# Patient Record
Sex: FEMALE | Race: OTHER | HISPANIC OR LATINO
[De-identification: names, ages, dates, MRNs, and addresses within clinical notes are randomized per-mention and may not be internally consistent; named-entity substitution may affect disease eponyms.]

---

## 2021-07-01 ENCOUNTER — NON-APPOINTMENT (OUTPATIENT)
Age: 36
End: 2021-07-01

## 2021-07-01 ENCOUNTER — APPOINTMENT (OUTPATIENT)
Dept: ORTHOPEDIC SURGERY | Facility: CLINIC | Age: 36
End: 2021-07-01
Payer: MEDICAID

## 2021-07-01 VITALS — HEIGHT: 64 IN | BODY MASS INDEX: 29.02 KG/M2 | WEIGHT: 170 LBS

## 2021-07-01 PROBLEM — Z00.00 ENCOUNTER FOR PREVENTIVE HEALTH EXAMINATION: Status: ACTIVE | Noted: 2021-07-01

## 2021-07-01 PROCEDURE — 99204 OFFICE O/P NEW MOD 45 MIN: CPT

## 2021-07-23 ENCOUNTER — APPOINTMENT (OUTPATIENT)
Dept: ORTHOPEDIC SURGERY | Facility: CLINIC | Age: 36
End: 2021-07-23
Payer: MEDICAID

## 2021-07-23 PROCEDURE — 99213 OFFICE O/P EST LOW 20 MIN: CPT

## 2021-07-23 PROCEDURE — 73560 X-RAY EXAM OF KNEE 1 OR 2: CPT | Mod: LT

## 2021-08-09 ENCOUNTER — APPOINTMENT (OUTPATIENT)
Dept: ORTHOPEDIC SURGERY | Facility: CLINIC | Age: 36
End: 2021-08-09
Payer: MEDICAID

## 2021-08-09 VITALS
WEIGHT: 170 LBS | BODY MASS INDEX: 29.02 KG/M2 | HEART RATE: 90 BPM | SYSTOLIC BLOOD PRESSURE: 123 MMHG | DIASTOLIC BLOOD PRESSURE: 85 MMHG | HEIGHT: 64 IN

## 2021-08-09 PROCEDURE — 99214 OFFICE O/P EST MOD 30 MIN: CPT

## 2021-08-13 NOTE — HISTORY OF PRESENT ILLNESS
[de-identified] : 36 year old female presents to the office with left knee pain 6/22/21, insidious onset after patient knee buckled while showering leading her to fall in bathtub. She was seen  at [place] and x-ray were negative. She followed up with Dr. Sharan branch with patella dislocation and referred her for further treatment. She has attended 2 session of PT with very small improvement of ROM. Ambulating via crutches. Reports decreased ROM, stiffness, swelling, weakness and limping. Pain indicated to anterior aspect of knee, 8/10, constant, achy and sharp at times. Denies locking, catching, numbness or tingling. \par \par The patient's past medical history, past surgical history, medications and allergies were reviewed by me today with the patient and documented accordingly. In addition, the patient's family and social history, which were noncontributory to this visit, were reviewed also.

## 2021-08-13 NOTE — DISCUSSION/SUMMARY
[de-identified] : 37 y/o female with left patella instability/chondral defect\par \par Patient has hypersensitivity throughout the lower extremity from what appears to be a left patella dislocation and subsequent chondral injury to the patellofemoral joint.  MRI  shows full thickness chondral in the apex of the patella and lateral patella facet 1.6 x 1.1 cm as well as disruption of the medial patella restraint.  Since her injury, patient has significantly limited range of motion, and recommendation would be for physical therapy in an attempt to improve range of motion and functionality of the lower extremity.  We discussed possible consideration of manipulation under anesthesia with diagnostic arthroscopy if symptoms do not significantly improve within 4 to 6 weeks.  We also discussed concerns regarding the chondral injury, and possibility for additional procedures for chondral resurfacing/stabilization of the patella.\par \par Recommendations: Trial PT. discontinue crutches, activity restriction.  OTC NSAID's or acetaminophen as tolerated, with application of ice to the area 2-3x daily for 20 minutes after periods of activity. \par \par Follow-up 4-6 weeks

## 2021-08-13 NOTE — PHYSICAL EXAM
[de-identified] : Oriented to time, place, person\par Mood: Normal\par Affect: Normal\par Appearance: Healthy, well appearing, no acute distress.\par Gait: antalgic\par Assistive Devices: one crutch\par \par Left Knee Exam:\par \par Skin: Clean, dry, intact\par Inspection: No obvious malalignment, no masses, min swelling, trace effusion\par Pulses: 2+ DP/PT pulses \par ROM: 0-25 degrees of flexion. ++hypersensitive with deep knee flexion\par Tenderness: Sensitive throughout the joint\par Stability: Stable to varus, valgus. Negative Lachman testing. Negative anterior drawer, negative posterior drawer.\par Strength: 5/5 Q/H/TA/GS/EHL, without atrophy\par Neuro: Intact to light touch throughout, DTRs normal\par Additional Tests: Negative Marisabel's test, Negative patellar grind test  [de-identified] : Images were reviewed from [facility] dated 7/23/21. \par \par Multiple images left knee showed no evidence of bony injury, or briana dislocation. There is no underlying degenerative arthritic change seen. Overall alignment is maintained.  Small fragmentation seen inferior pole patella.\par \par MRI left knee dated 6.30.2021 shows full thickness chondral defect in the apex of the patella and medial patella facet measuring 1.6 x 1.1 cm.  Mild lateral patella subluxation.  Medial patellar retinacular tear.

## 2021-08-13 NOTE — ADDENDUM
[FreeTextEntry1] : This note was written by Maddie Segura on 08/09/2021 acting solely as a scribe for Dr. Ronni Almodovar.\par \par All medical record entries made by the Scribe were at my, Dr. Ronni Almodovar, direction and personally dictated by me on 08/09/2021. I have personally reviewed the chart and agree that the record accurately reflects my personal performance of the history, physical exam, assessment and plan.

## 2021-09-27 ENCOUNTER — APPOINTMENT (OUTPATIENT)
Dept: ORTHOPEDIC SURGERY | Facility: CLINIC | Age: 36
End: 2021-09-27
Payer: MEDICAID

## 2021-09-27 VITALS — BODY MASS INDEX: 29.02 KG/M2 | HEIGHT: 64 IN | WEIGHT: 170 LBS

## 2021-09-27 PROCEDURE — 99213 OFFICE O/P EST LOW 20 MIN: CPT

## 2021-09-27 NOTE — PHYSICAL EXAM
[de-identified] : Oriented to time, place, person\par Mood: Normal\par Affect: Normal\par Appearance: Healthy, well appearing, no acute distress.\par Gait: antalgic\par Assistive Devices: none\par \par Left Knee Exam:\par \par Skin: Clean, dry, intact\par Inspection: No obvious malalignment, no masses, min swelling, no effusion\par Pulses: 2+ DP/PT pulses \par ROM: 0-80 degrees of flexion.  Pain with deep knee flexion\par Tenderness: Sensitive throughout the PF joint\par Stability: Stable to varus, valgus. Negative Lachman testing. Negative anterior drawer, negative posterior drawer. + J sign (BL)\par Strength: 5/5 Q/H/TA/GS/EHL, without atrophy\par Neuro: Intact to light touch throughout, DTRs normal\par Additional Tests: Negative Marisabel's test, +patellar grind test  [de-identified] : Images were reviewed from [facility] dated 7/23/21. \par \par Multiple images left knee showed no evidence of bony injury, or briana dislocation. There is no underlying degenerative arthritic change seen. Overall alignment is maintained.  Small fragmentation seen inferior pole patella.\par \par MRI left knee dated 6.30.2021 shows full thickness chondral defect in the apex of the patella and medial patella facet measuring 1.6 x 1.1 cm.  Mild lateral patella subluxation.  Medial patellar retinacular tear.

## 2021-09-27 NOTE — DISCUSSION/SUMMARY
[de-identified] : 35 y/o female with left patella instability/chondral defect\par \par Patient has improved symptoms to the left knee at this time.  She has improved range of motion, but with increased sensitivity to the patellofemoral joint.  The does appear to be patella malalignment with positive J sign clinically, as well as a catching sensation at 30 degrees of flexion consistent with the chondral defect.  Patient's hypersensitivity has significantly improved.  \par \par At this time, we began to discuss consideration of surgical management for correction of her current dysfunction.  We will obtain a CT scan to evaluate for lower extremity malalignment, and consideration of surgical stabilization of the patella with or without chondral resurfacing procedure.  We briefly discussed consideration of a tibial tubercle osteotomy +/- MPFL reconstruction +/- chondral resurfacing.\par \par Recommendations: Continue PT as discussed; quad strengthening, range of motion therapy. J-Brace use.  OTC NSAID's or acetaminophen as tolerated, with application of ice to the area 2-3x daily for 20 minutes.\par \par Follow-up after CT scan for surgical planning.

## 2021-09-27 NOTE — HISTORY OF PRESENT ILLNESS
[de-identified] : 36 year old female presents today for follow up of left knee instability. She has been attending PT 2 x per week and has been told by her therapist that her patella is unstable/loose.  The physical therapist has also told her to not wear her patella J brace.  On 6/22/21 the patient's knee buckled while showering leading her to fall in bathtub. Ambulating without assistive device at this time. Reports decreased ROM, stiffness, swelling, weakness and limping. Pain indicated to anterior aspect of knee, 9/10, constant, achy and sharp at times.  Range of motion is improving.  Denies locking, catching, numbness or tingling.

## 2021-09-27 NOTE — ADDENDUM
[FreeTextEntry1] : This note was written by Maddie Segura on 09/27/2021 acting solely as a scribe for Dr. Ronni Almodovar.\par \par All medical record entries made by the Scribe were at my, Dr. Ronni Almodovar, direction and personally dictated by me on 09/27/2021. I have personally reviewed the chart and agree that the record accurately reflects my personal performance of the history, physical exam, assessment and plan.

## 2021-09-29 ENCOUNTER — NON-APPOINTMENT (OUTPATIENT)
Age: 36
End: 2021-09-29

## 2021-09-30 ENCOUNTER — APPOINTMENT (OUTPATIENT)
Dept: CT IMAGING | Facility: CLINIC | Age: 36
End: 2021-09-30
Payer: MEDICAID

## 2021-09-30 ENCOUNTER — OUTPATIENT (OUTPATIENT)
Dept: OUTPATIENT SERVICES | Facility: HOSPITAL | Age: 36
LOS: 1 days | End: 2021-09-30
Payer: MEDICAID

## 2021-09-30 DIAGNOSIS — S83.002D: ICD-10-CM

## 2021-09-30 DIAGNOSIS — M23.8X2 OTHER INTERNAL DERANGEMENTS OF LEFT KNEE: ICD-10-CM

## 2021-09-30 PROCEDURE — 73700 CT LOWER EXTREMITY W/O DYE: CPT

## 2021-09-30 PROCEDURE — 73700 CT LOWER EXTREMITY W/O DYE: CPT | Mod: 26,LT

## 2021-10-05 ENCOUNTER — APPOINTMENT (OUTPATIENT)
Dept: ORTHOPEDIC SURGERY | Facility: CLINIC | Age: 36
End: 2021-10-05
Payer: MEDICAID

## 2021-10-05 VITALS
WEIGHT: 170 LBS | HEART RATE: 90 BPM | SYSTOLIC BLOOD PRESSURE: 125 MMHG | BODY MASS INDEX: 29.02 KG/M2 | DIASTOLIC BLOOD PRESSURE: 80 MMHG | HEIGHT: 64 IN

## 2021-10-05 PROCEDURE — 99214 OFFICE O/P EST MOD 30 MIN: CPT

## 2021-10-07 NOTE — DISCUSSION/SUMMARY
[de-identified] : 37 y/o female with left patella instability/chondral defect\par \par Patient presents for surgical consideration of her left knee.  I discussed surgical management in detail with the patient with a  present.  I discussed that she has two conditions of the knee that warrants further intervention at this time.  The first is her chondral defect.  The patient has a chondral defect of the medial patella facet with what appears to be a loose fragment within the patellofemoral joint as seen on CT imaging.  She is now approximately 3 months from injury, and we discussed consideration of surgical retrieval and possible fixation versus excision.  If the osteochondral fragment is unable to be repaired, she may be a candidate for future chondral resurfacing procedure.  \par \par Her second issue is her patella instability.  There is significant lateral subluxation of the patella with an elevated TTTG.  Consideration would be for surgical stabilization of the patella with either isolated medial soft tissue procedure with MPFL reconstruction vs. tibial tubercle osteotomy and MPFL reconstruction.  \par \par All risks, benefits and alternatives to the proposed surgical procedure, left knee surgical stabilization/chondral repair with medial patellofemoral ligament reconstruction/tibial tubercle osteotomy, as well as the need for formal post-operative rehabilitation were discussed in great detail with the patient. Risks include but are not limited to pain, bleeding, infection, neurovascular injury, stiffness, further surgical procedures, medical complications (including DVT, PE, MI), and risks of anesthesia. \par \par A discussion was also had with the patient regarding additional risk given recent Covid-19 pandemic; including the potential additional risk of anesthesia and any medical comorbidities that the patient has.  It was made clear to the patient that we are taking all precautions regarding Covid-19 with regards to surgical scheduling and perioperative care.  The patient understands that current protocol requires Covid-19 testing no more than 48hrs prior to surgery, and PST's may be performed onsite at the day of surgery. \par \par The patient expressed understanding and all questions were answered. The patient is electing to proceed, and will have the patient scheduled accordingly.

## 2021-10-07 NOTE — HISTORY OF PRESENT ILLNESS
[de-identified] : 36 year old female presents today for follow up of left knee instability.  To review, on 6/22/21 the patient's knee buckled while showering leading her to fall in bathtub.  She sustained a patella dislocation at that time.  She was referred to my service 2 months later and had significant loss of motion.  She has been undergoing physical therapy with improvements in motion.  Presents today for consideration of surgical intervention and review of imaging. Symptoms have remained the same since last visit. Reports decreased ROM, stiffness, swelling, weakness and limping. Still with sensation of catching to the anterior knee.

## 2021-10-07 NOTE — PHYSICAL EXAM
[de-identified] : Oriented to time, place, person\par Mood: Normal\par Affect: Normal\par Appearance: Healthy, well appearing, no acute distress.\par Gait: antalgic\par Assistive Devices: none\par \par Left Knee Exam:\par \par Skin: Clean, dry, intact\par Inspection: No obvious malalignment, no masses, min swelling, no effusion\par Pulses: 2+ DP/PT pulses \par ROM: 0-90 degrees of flexion.  Pain with deep knee flexion\par Tenderness: Sensitive throughout the PF joint\par Stability: Stable to varus, valgus. Negative Lachman testing. Negative anterior drawer, negative posterior drawer. + J sign (BL)\par Strength: 5/5 Q/H/TA/GS/EHL, without atrophy\par Neuro: Intact to light touch throughout, DTRs normal\par Additional Tests: Negative Marisabel's test, +patellar grind test  [de-identified] : Images were reviewed from [facility] dated 7/23/21. \par \par Multiple images left knee showed no evidence of bony injury, or briana dislocation. There is no underlying degenerative arthritic change seen. Overall alignment is maintained.  Small fragmentation seen inferior pole patella.\par \par MRI left knee dated 6.30.2021 shows full thickness chondral defect in the apex of the patella and medial patella facet measuring 1.6 x 1.1 cm.  Mild lateral patella subluxation.  Medial patellar retinacular tear.\par \par CT left knee dated 9/30/2021 shows evidence of cortical irregularity medial patella.  Lateral subluxation of patella. TTTG is 2.0cm.  Trochlear dysplasia.  Loose osteochondral fragment within the patellofemoral joint.

## 2021-10-20 ENCOUNTER — OUTPATIENT (OUTPATIENT)
Dept: OUTPATIENT SERVICES | Facility: HOSPITAL | Age: 36
LOS: 1 days | End: 2021-10-20

## 2021-10-20 VITALS
OXYGEN SATURATION: 98 % | SYSTOLIC BLOOD PRESSURE: 140 MMHG | WEIGHT: 177.91 LBS | RESPIRATION RATE: 18 BRPM | DIASTOLIC BLOOD PRESSURE: 80 MMHG | HEART RATE: 90 BPM | TEMPERATURE: 99 F | HEIGHT: 66 IN

## 2021-10-20 DIAGNOSIS — M25.362 OTHER INSTABILITY, LEFT KNEE: ICD-10-CM

## 2021-10-20 DIAGNOSIS — M23.8X2 OTHER INTERNAL DERANGEMENTS OF LEFT KNEE: ICD-10-CM

## 2021-10-20 DIAGNOSIS — Z98.891 HISTORY OF UTERINE SCAR FROM PREVIOUS SURGERY: Chronic | ICD-10-CM

## 2021-10-20 LAB
HCG UR QL: NEGATIVE — SIGNIFICANT CHANGE UP
HCT VFR BLD CALC: 39.4 % — SIGNIFICANT CHANGE UP (ref 34.5–45)
HGB BLD-MCNC: 13.1 G/DL — SIGNIFICANT CHANGE UP (ref 11.5–15.5)
MCHC RBC-ENTMCNC: 29.4 PG — SIGNIFICANT CHANGE UP (ref 27–34)
MCHC RBC-ENTMCNC: 33.2 GM/DL — SIGNIFICANT CHANGE UP (ref 32–36)
MCV RBC AUTO: 88.5 FL — SIGNIFICANT CHANGE UP (ref 80–100)
NRBC # BLD: 0 /100 WBCS — SIGNIFICANT CHANGE UP
NRBC # FLD: 0 K/UL — SIGNIFICANT CHANGE UP
PLATELET # BLD AUTO: 316 K/UL — SIGNIFICANT CHANGE UP (ref 150–400)
RBC # BLD: 4.45 M/UL — SIGNIFICANT CHANGE UP (ref 3.8–5.2)
RBC # FLD: 12.9 % — SIGNIFICANT CHANGE UP (ref 10.3–14.5)
WBC # BLD: 10.19 K/UL — SIGNIFICANT CHANGE UP (ref 3.8–10.5)
WBC # FLD AUTO: 10.19 K/UL — SIGNIFICANT CHANGE UP (ref 3.8–10.5)

## 2021-10-20 NOTE — H&P PST ADULT - PROBLEM SELECTOR PLAN 1
Pt scheduled for surgery on 11/3/2021.  Pre-op instructions provided. Pt verbalized understanding.   Pepcid provided for GI prophylaxis.   Pt given urine specimen cup for ucg on admission.   Pt given detailed verbal and written instructions on chlorhexidine wash. Pt verbalized understanding with teachback.   Pt states she is already scheduled for preop COVID testing.

## 2021-10-20 NOTE — H&P PST ADULT - HISTORY OF PRESENT ILLNESS
36 year old female with left knee injury after fall in 6/22/2021. Pt went for physical therapy but was still having pain. Pt presents today for presurgical evaluation for ... 36 year old female with left knee injury after fall in 6/22/2021. Pt went for physical therapy but still having pain. Pt presents today for presurgical evaluation for Left Diagnostic Knee Arthroscopy Open Reduction Internal Fixation Osteochondral Extra Articular Ligament Reconstruction Medial Patella Femoral Ligament Reconstruction Tibial Tubercle Osteotomy.

## 2021-10-31 ENCOUNTER — APPOINTMENT (OUTPATIENT)
Dept: DISASTER EMERGENCY | Facility: CLINIC | Age: 36
End: 2021-10-31

## 2021-10-31 DIAGNOSIS — Z01.818 ENCOUNTER FOR OTHER PREPROCEDURAL EXAMINATION: ICD-10-CM

## 2021-11-02 ENCOUNTER — TRANSCRIPTION ENCOUNTER (OUTPATIENT)
Age: 36
End: 2021-11-02

## 2021-11-02 VITALS
HEIGHT: 66 IN | HEART RATE: 85 BPM | TEMPERATURE: 97 F | OXYGEN SATURATION: 100 % | DIASTOLIC BLOOD PRESSURE: 85 MMHG | WEIGHT: 177.91 LBS | RESPIRATION RATE: 16 BRPM | SYSTOLIC BLOOD PRESSURE: 143 MMHG

## 2021-11-02 LAB — SARS-COV-2 N GENE NPH QL NAA+PROBE: NOT DETECTED

## 2021-11-02 RX ORDER — ASPIRIN 325 MG/1
325 TABLET, FILM COATED ORAL DAILY
Qty: 28 | Refills: 0 | Status: ACTIVE | COMMUNITY
Start: 2021-11-02 | End: 1900-01-01

## 2021-11-02 RX ORDER — OXYCODONE AND ACETAMINOPHEN 5; 325 MG/1; MG/1
5-325 TABLET ORAL
Qty: 30 | Refills: 0 | Status: ACTIVE | COMMUNITY
Start: 2021-11-02 | End: 1900-01-01

## 2021-11-03 ENCOUNTER — APPOINTMENT (OUTPATIENT)
Dept: ORTHOPEDIC SURGERY | Facility: AMBULATORY SURGERY CENTER | Age: 36
End: 2021-11-03

## 2021-11-03 ENCOUNTER — OUTPATIENT (OUTPATIENT)
Dept: OUTPATIENT SERVICES | Facility: HOSPITAL | Age: 36
LOS: 1 days | Discharge: HOME | End: 2021-11-03
Payer: MEDICAID

## 2021-11-03 VITALS
SYSTOLIC BLOOD PRESSURE: 127 MMHG | TEMPERATURE: 98 F | HEART RATE: 78 BPM | OXYGEN SATURATION: 99 % | DIASTOLIC BLOOD PRESSURE: 82 MMHG | RESPIRATION RATE: 15 BRPM

## 2021-11-03 DIAGNOSIS — M25.362 OTHER INSTABILITY, LEFT KNEE: ICD-10-CM

## 2021-11-03 DIAGNOSIS — Z98.891 HISTORY OF UTERINE SCAR FROM PREVIOUS SURGERY: Chronic | ICD-10-CM

## 2021-11-03 PROCEDURE — 27418 REPAIR DEGENERATED KNEECAP: CPT | Mod: LT

## 2021-11-03 PROCEDURE — 29874 ARTHRS KNEE SURG RMV LOOS/FB: CPT | Mod: LT

## 2021-11-03 NOTE — ASU DISCHARGE PLAN (ADULT/PEDIATRIC) - PROCEDURE
Left diagnostic knee arthroscopy, open reduction and internal fixation of osteochondral fragment, extraarticular ligament reconstruction, tibial tubercle osteotomy

## 2021-11-03 NOTE — ASU DISCHARGE PLAN (ADULT/PEDIATRIC) - CARE PROVIDER_API CALL
Ronni Almodovar)  Orthopedics  [address]  [place] [address]  Phone: [phone]  Fax: [phone]  Follow Up Time: 1 week

## 2021-11-15 ENCOUNTER — APPOINTMENT (OUTPATIENT)
Dept: ORTHOPEDIC SURGERY | Facility: CLINIC | Age: 36
End: 2021-11-15
Payer: MEDICAID

## 2021-11-15 PROBLEM — R76.8 OTHER SPECIFIED ABNORMAL IMMUNOLOGICAL FINDINGS IN SERUM: Chronic | Status: ACTIVE | Noted: 2021-10-20

## 2021-11-15 PROCEDURE — 99024 POSTOP FOLLOW-UP VISIT: CPT

## 2021-11-15 PROCEDURE — 73560 X-RAY EXAM OF KNEE 1 OR 2: CPT | Mod: LT

## 2021-11-17 NOTE — HISTORY OF PRESENT ILLNESS
[6] : the patient reports pain that is 6/10 in severity [Clean/Dry/Intact] : clean, dry and intact [Healed] : healed [Neuro Intact] : an unremarkable neurological exam [Vascular Intact] : ~T peripheral vascular exam normal [Doing Well] : is doing well [Excellent Pain Control] : has excellent pain control [No Sign of Infection] : is showing no signs of infection [Sutures Removed] : sutures were removed [Steri-Strips Removed & Replaced] : steri-strips removed and replaced [Chills] : no chills [Fever] : no fever [Nausea] : no nausea [Vomiting] : no vomiting [Erythema] : not erythematous [Discharge] : absent of discharge [Swelling] : not swollen [Dehiscence] : not dehisced [de-identified] : 37 y/o female s/p left knee TAYLER, removal of loose body, chondroplasty medial patellar facet, lateral release, tibial tubercle osteotomy, medial patella imbrication 11.3.2021 [de-identified] : 37 y/o female s/p left knee TAYLER, removal of loose body, chondroplasty medial patellar facet, lateral release, tibial tubercle osteotomy, medial patella imbrication. She is doing well. Presents in [place] brace and ambulating via crutches. She is taking Percocet for pain. Denies post op complications.  [de-identified] : Left Knee Exam:\par \par Skin: Incision(s) Clean, dry, intact, no drainage, healed\par Inspection: Residual swelling, moderate residual effusion, ecchymosis\par Pulses: 2+ DP/PT pulses \par ROM: Not tested \par Tenderness: Tender throughout anterior knee joint.\par Stability: Stable\par Strength: Intact Q/H/TA/GS/EHL\par Neuro: Intact to light touch throughout  [de-identified] : \par The following radiographs were ordered and read by me during this patients visit. I reviewed each radiograph in detail with the patient and discussed the findings as highlighted below. \par \par 2 views left knee show evidence of tibial tubercle osteotomy with 1 cm medial translation tubercle. [de-identified] : 35 y/o female s/p left knee TAYLER, removal of loose body, chondroplasty medial patellar facet, lateral release, tibial tubercle osteotomy, medial patella imbrication\par \par All intraoperative imaging was discussed in detail with the patient. All questions were answered.  We discussed role of osteotomy in patients current condition, as well as the concern for degree of chondral damage within the medial patellar facet.\par \par Recommendations: \par 1. PT evaluation and treatment; including AAROM/AROM, therapeutic exercise (include hamstring and quadriceps strengthening), heel slides, nonweightbearing stretch of the gastrocsoleus complex and straight leg raises to prevent extension lag. \par 2. Brace: TTWB. Unlock brace for ambulation as tolerated. Remove the brace for sleeping as tolerated. \par 3. Meds: Continue [id number] daily, pain medication prn, may transition to NSAIDS.\par 4. Ice/elevate as needed and\par 5. Restrictions: None \par \par Followup in 4 weeks.

## 2021-11-17 NOTE — ADDENDUM
[FreeTextEntry1] : This note was written by Maddie Segura on 11/15/2021 acting solely as a scribe for Dr. Ronni Almodovar.\par \par All medical record entries made by the Scribe were at my, Dr. Ronni Almodovar, direction and personally dictated by me on 11/15/2021. I have personally reviewed the chart and agree that the record accurately reflects my personal performance of the history, physical exam, assessment and plan.

## 2021-12-16 ENCOUNTER — APPOINTMENT (OUTPATIENT)
Dept: ORTHOPEDIC SURGERY | Facility: CLINIC | Age: 36
End: 2021-12-16
Payer: MEDICAID

## 2021-12-16 PROCEDURE — 99024 POSTOP FOLLOW-UP VISIT: CPT

## 2021-12-16 PROCEDURE — 73562 X-RAY EXAM OF KNEE 3: CPT | Mod: LT

## 2021-12-16 NOTE — HISTORY OF PRESENT ILLNESS
[0] : no pain reported [Clean/Dry/Intact] : clean, dry and intact [Healed] : healed [Neuro Intact] : an unremarkable neurological exam [Vascular Intact] : ~T peripheral vascular exam normal [Doing Well] : is doing well [Excellent Pain Control] : has excellent pain control [No Sign of Infection] : is showing no signs of infection [Chills] : no chills [Fever] : no fever [Nausea] : no nausea [Vomiting] : no vomiting [Erythema] : not erythematous [Discharge] : absent of discharge [Swelling] : not swollen [Dehiscence] : not dehisced [de-identified] : 35 y/o female s/p left knee TAYLER, removal of loose body, chondroplasty medial patellar facet, lateral release, tibial tubercle osteotomy, medial patella imbrication 11.3.2021 [de-identified] : 37 y/o female s/p left knee TAYLER, removal of loose body, chondroplasty medial patellar facet, lateral release, tibial tubercle osteotomy, medial patella imbrication. Attending PT 2x per week.  She is doing well. Presents in unlocked [place] brace and ambulating via crutches. She is not taking pain medication. Denies post op complications.  [de-identified] : Left Knee Exam:\par \par Skin: Incision(s) Clean, dry, intact, no drainage, healed\par Inspection: Residual swelling, mild residual effusion\par Pulses: 2+ DP/PT pulses \par ROM: 0-65 knee flexion with pain.  \par Tenderness: mild tender throughout anterior knee joint.\par Stability: Stable\par Strength: Intact Q/H/TA/GS/EHL, atrophy to the quad\par Neuro: Intact to light touch throughout  [de-identified] : The following radiographs were ordered and read by me during this patients visit. I reviewed each radiograph in detail with the patient and discussed the findings as highlighted below. \par \par 2 views left knee show continued evidence of tibial tubercle osteotomy with 1 cm medial translation tubercle. Evidence of union. [de-identified] : 35 y/o female s/p left knee TAYLER, removal of loose body, chondroplasty medial patellar facet, lateral release, tibial tubercle osteotomy, medial patella imbrication\par \par Patient is doing well with postoperative rehabilitation at this time, but does have some stiffness with high degree of flexion. We discussed next steps which include weightbearing as tolerated in the brace unlocked.  Patient may also benefit from weightbearing without crutches.\par \par Recommendations: \par 1. PT evaluation and treatment, continued range of motion therapy.\par 2. Brace: WBAT. Unlock brace for ambulation as tolerated. Remove the brace for sleeping as tolerated. \par 3. Meds: NSAIDS.\par 4. Ice/elevate as needed \par \par Followup in 6 weeks.

## 2021-12-16 NOTE — ADDENDUM
[FreeTextEntry1] : This note was written by Maddie Segura on 12/16/2021 acting solely as a scribe for Dr. Ronni Almodovar.\par \par All medical record entries made by the Scribe were at my, Dr. Ronni Almodovar, direction and personally dictated by me on 12/16/2021. I have personally reviewed the chart and agree that the record accurately reflects my personal performance of the history, physical exam, assessment and plan.

## 2022-02-10 ENCOUNTER — APPOINTMENT (OUTPATIENT)
Dept: ORTHOPEDIC SURGERY | Facility: CLINIC | Age: 37
End: 2022-02-10
Payer: MEDICAID

## 2022-02-10 VITALS — HEIGHT: 64 IN | WEIGHT: 170 LBS | BODY MASS INDEX: 29.02 KG/M2

## 2022-02-10 PROCEDURE — 99213 OFFICE O/P EST LOW 20 MIN: CPT

## 2022-02-11 NOTE — DISCUSSION/SUMMARY
[de-identified] : 35 y/o female s/p left knee TAYLER, removal of loose body, chondroplasty medial patellar facet, lateral release, tibial tubercle osteotomy, medial patella imbrication\par \par Patient is doing well with postoperative rehabilitation at this time and is progressing well with her postoperative rehabilitation program.  She continues to have some stiffness with high degree of flexion and we discussed aggressive ROM therapy to further improve the function of the knee given radiographic union of the tibial tubercle osteotomy. The patient is very happy with her progress at this time, and preoperative symptoms of catching within the patellofemoral joint have resolved.. \par \par Recommendations: Continue PT evaluation and treatment, continued focus on range of motion therapy. NSAIDS / Ice/elevate as needed \par \par Followup 3 months.

## 2022-02-11 NOTE — PHYSICAL EXAM
[de-identified] : Oriented to time, place, person\par Mood: Normal\par Affect: Normal\par Appearance: Healthy, well appearing, no acute distress.\par Gait: Normal\par Assistive Devices: None \par \par Left Knee Exam:\par \par Skin: Incision(s) Clean, dry, intact, no drainage, healed\par Inspection: Residual swelling, no effusion\par Pulses: 2+ DP/PT pulses \par ROM: 0-90 knee flexion.  Pain with further flexion\par Tenderness: None\par Stability: Stable\par Strength: 4+/5 Q 5/5 TA/GS/EHL, atrophy to the quad\par Neuro: Intact to light touch throughout.  [de-identified] : The following radiographs were ordered and read by me during this patients visit. I reviewed each radiograph in detail with the patient and discussed the findings as highlighted below. \par \par 2 views left knee show continued evidence of tibial tubercle osteotomy with 1 cm medial translation tubercle. Evidence of union.

## 2022-02-11 NOTE — ADDENDUM
[FreeTextEntry1] : This note was written by Maddie Segura on 02/10/2022 acting solely as a scribe for Dr. Ronni Almodovar.\par \par All medical record entries made by the Scribe were at my, Dr. Ronni Almodovar, direction and personally dictated by me on 02/10/2022. I have personally reviewed the chart and agree that the record accurately reflects my personal performance of the history, physical exam, assessment and plan.

## 2022-02-11 NOTE — HISTORY OF PRESENT ILLNESS
[8] : the patient reports pain that is 8/10 in severity [Clean/Dry/Intact] : clean, dry and intact [Healed] : healed [Neuro Intact] : an unremarkable neurological exam [Vascular Intact] : ~T peripheral vascular exam normal [Doing Well] : is doing well [Excellent Pain Control] : has excellent pain control [No Sign of Infection] : is showing no signs of infection [Chills] : no chills [Fever] : no fever [Nausea] : no nausea [Vomiting] : no vomiting [Erythema] : not erythematous [Discharge] : absent of discharge [Swelling] : not swollen [Dehiscence] : not dehisced [de-identified] : 35 y/o female s/p left knee TAYLER, removal of loose body, chondroplasty medial patellar facet, lateral release, tibial tubercle osteotomy, medial patella imbrication 11.3.2021 [de-identified] : Left Knee Exam:\par \par Skin: Incision(s) Clean, dry, intact, no drainage, healed\par Inspection: Residual swelling, no effusion\par Pulses: 2+ DP/PT pulses \par ROM: 0-90 knee flexion with pain.  \par Tenderness: no tender throughout anterior knee joint.\par Stability: Stable\par Strength: Intact Q/H/TA/GS/EHL, atrophy to the quad\par Neuro: Intact to light touch throughout  [de-identified] : The following radiographs were ordered and read by me during this patients visit. I reviewed each radiograph in detail with the patient and discussed the findings as highlighted below. \par \par 2 views left knee show continued evidence of tibial tubercle osteotomy with 1 cm medial translation tubercle. Evidence of union. [de-identified] : 35 y/o female s/p left knee TAYLER, removal of loose body, chondroplasty medial patellar facet, lateral release, tibial tubercle osteotomy, medial patella imbrication\par \par Patient is doing well with postoperative rehabilitation at this time, but does have some stiffness with high degree of flexion. We discussed next steps which include weightbearing as tolerated in the brace unlocked.  Patient may also benefit from weightbearing without crutches.\par \par Recommendations: \par 1. PT evaluation and treatment, continued range of motion therapy.\par 2. Brace: WBAT. Unlock brace for ambulation as tolerated. Remove the brace for sleeping as tolerated. \par 3. Meds: NSAIDS.\par 4. Ice/elevate as needed \par \par Followup in 6 weeks. [de-identified] : 35 y/o female s/p left knee TAYLER, removal of loose body, chondroplasty medial patellar facet, lateral release, tibial tubercle osteotomy, medial patella imbrication. Attending PT 2x per week. She is doing well. She has transitioned out of brace and ambulating with out assistive device. She is not taking pain medication. Denies post op complications.

## 2022-02-11 NOTE — PHYSICAL EXAM
[de-identified] : Oriented to time, place, person\par Mood: Normal\par Affect: Normal\par Appearance: Healthy, well appearing, no acute distress.\par Gait: Normal\par Assistive Devices: None \par \par Left Knee Exam:\par \par Skin: Incision(s) Clean, dry, intact, no drainage, healed\par Inspection: Residual swelling, no effusion\par Pulses: 2+ DP/PT pulses \par ROM: 0-90 knee flexion.  Pain with further flexion\par Tenderness: None\par Stability: Stable\par Strength: 4+/5 Q 5/5 TA/GS/EHL, atrophy to the quad\par Neuro: Intact to light touch throughout.  [de-identified] : The following radiographs were ordered and read by me during this patients visit. I reviewed each radiograph in detail with the patient and discussed the findings as highlighted below. \par \par 2 views left knee show continued evidence of tibial tubercle osteotomy with 1 cm medial translation tubercle. Evidence of union.

## 2022-02-11 NOTE — HISTORY OF PRESENT ILLNESS
[8] : the patient reports pain that is 8/10 in severity [Clean/Dry/Intact] : clean, dry and intact [Healed] : healed [Neuro Intact] : an unremarkable neurological exam [Vascular Intact] : ~T peripheral vascular exam normal [Doing Well] : is doing well [Excellent Pain Control] : has excellent pain control [No Sign of Infection] : is showing no signs of infection [Chills] : no chills [Fever] : no fever [Nausea] : no nausea [Vomiting] : no vomiting [Erythema] : not erythematous [Discharge] : absent of discharge [Swelling] : not swollen [Dehiscence] : not dehisced [de-identified] : 37 y/o female s/p left knee TAYLER, removal of loose body, chondroplasty medial patellar facet, lateral release, tibial tubercle osteotomy, medial patella imbrication 11.3.2021 [de-identified] : Left Knee Exam:\par \par Skin: Incision(s) Clean, dry, intact, no drainage, healed\par Inspection: Residual swelling, no effusion\par Pulses: 2+ DP/PT pulses \par ROM: 0-90 knee flexion with pain.  \par Tenderness: no tender throughout anterior knee joint.\par Stability: Stable\par Strength: Intact Q/H/TA/GS/EHL, atrophy to the quad\par Neuro: Intact to light touch throughout  [de-identified] : The following radiographs were ordered and read by me during this patients visit. I reviewed each radiograph in detail with the patient and discussed the findings as highlighted below. \par \par 2 views left knee show continued evidence of tibial tubercle osteotomy with 1 cm medial translation tubercle. Evidence of union. [de-identified] : 35 y/o female s/p left knee TAYLER, removal of loose body, chondroplasty medial patellar facet, lateral release, tibial tubercle osteotomy, medial patella imbrication\par \par Patient is doing well with postoperative rehabilitation at this time, but does have some stiffness with high degree of flexion. We discussed next steps which include weightbearing as tolerated in the brace unlocked.  Patient may also benefit from weightbearing without crutches.\par \par Recommendations: \par 1. PT evaluation and treatment, continued range of motion therapy.\par 2. Brace: WBAT. Unlock brace for ambulation as tolerated. Remove the brace for sleeping as tolerated. \par 3. Meds: NSAIDS.\par 4. Ice/elevate as needed \par \par Followup in 6 weeks. [de-identified] : 35 y/o female s/p left knee TAYLER, removal of loose body, chondroplasty medial patellar facet, lateral release, tibial tubercle osteotomy, medial patella imbrication. Attending PT 2x per week. She is doing well. She has transitioned out of brace and ambulating with out assistive device. She is not taking pain medication. Denies post op complications.

## 2022-02-11 NOTE — DISCUSSION/SUMMARY
[de-identified] : 35 y/o female s/p left knee TAYLER, removal of loose body, chondroplasty medial patellar facet, lateral release, tibial tubercle osteotomy, medial patella imbrication\par \par Patient is doing well with postoperative rehabilitation at this time and is progressing well with her postoperative rehabilitation program.  She continues to have some stiffness with high degree of flexion and we discussed aggressive ROM therapy to further improve the function of the knee given radiographic union of the tibial tubercle osteotomy. The patient is very happy with her progress at this time, and preoperative symptoms of catching within the patellofemoral joint have resolved.. \par \par Recommendations: Continue PT evaluation and treatment, continued focus on range of motion therapy. NSAIDS / Ice/elevate as needed \par \par Followup 3 months.

## 2022-05-17 ENCOUNTER — APPOINTMENT (OUTPATIENT)
Dept: ORTHOPEDIC SURGERY | Facility: CLINIC | Age: 37
End: 2022-05-17
Payer: MEDICAID

## 2022-05-17 VITALS — HEIGHT: 64 IN | WEIGHT: 170 LBS | BODY MASS INDEX: 29.02 KG/M2

## 2022-05-17 PROCEDURE — 99213 OFFICE O/P EST LOW 20 MIN: CPT

## 2022-05-17 PROCEDURE — 73562 X-RAY EXAM OF KNEE 3: CPT | Mod: LT

## 2022-05-20 NOTE — PHYSICAL EXAM
[de-identified] : Oriented to time, place, person\par Mood: Normal\par Affect: Normal\par Appearance: Healthy, well appearing, no acute distress.\par Gait: Normal\par Assistive Devices: None \par \par Left Knee Exam:\par \par Skin: Incision(s) Clean, dry, intact, no drainage, healed\par Inspection: Residual swelling, no effusion\par Pulses: 2+ DP/PT pulses \par ROM: 0-120 knee flexion.  Pain with further flexion\par Tenderness: None\par Stability: Stable\par Strength: 4+/5 Q 5/5 TA/GS/EHL, atrophy to the quad\par Neuro: Intact to light touch throughout.  [de-identified] : The following radiographs were ordered and read by me during this patients visit. I reviewed each radiograph in detail with the patient and discussed the findings as highlighted below. \par \par 2 views left knee show continued evidence of tibial tubercle osteotomy with 1 cm medial translation tubercle. Evidence of union.

## 2022-05-20 NOTE — HISTORY OF PRESENT ILLNESS
[de-identified] : 36 y/o female s/p left knee TAYLER, removal of loose body, chondroplasty medial patellar facet, lateral release, tibial tubercle osteotomy, medial patella imbrication. She finished pt 2 weeks ago and able to do stationery bike without pain. C/O pain with knee flexion and climbing stairs. She is doing well. She is not taking pain medication. Denies post op complications.

## 2022-05-20 NOTE — ADDENDUM
[FreeTextEntry1] : This note was written by Maddie Segura on 05/17/2022 acting solely as a scribe for Dr. Ronni Almodovar.\par \par All medical record entries made by the Scribe were at my, Dr. Ronni Almodovar, direction and personally dictated by me on 05/17/2022. I have personally reviewed the chart and agree that the record accurately reflects my personal performance of the history, physical exam, assessment and plan.

## 2022-05-20 NOTE — DISCUSSION/SUMMARY
[de-identified] : 36 y/o female s/p left knee TAYLER, removal of loose body, chondroplasty medial patellar facet, lateral release, tibial tubercle osteotomy, medial patella imbrication\par \par Patient has completed a postoperative rehabilitation program.  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 The patient is very happy with her progress at this time, and preoperative symptoms of catching and instability to the patellofemoral joint have resolved. \par \par Recommendations: Continue PT evaluation and treatment, new Rx given. HEP.  Activity to tolerance. NSAIDS / Ice/elevate as needed \par \par Followup 1 year post-op.

## 2022-12-06 ENCOUNTER — APPOINTMENT (OUTPATIENT)
Dept: ORTHOPEDIC SURGERY | Facility: CLINIC | Age: 37
End: 2022-12-06

## 2022-12-06 VITALS — WEIGHT: 170 LBS | HEIGHT: 64 IN | BODY MASS INDEX: 29.02 KG/M2

## 2022-12-06 DIAGNOSIS — S83.002D UNSPECIFIED SUBLUXATION OF LEFT PATELLA, SUBSEQUENT ENCOUNTER: ICD-10-CM

## 2022-12-06 DIAGNOSIS — M23.8X2 OTHER INTERNAL DERANGEMENTS OF LEFT KNEE: ICD-10-CM

## 2022-12-06 PROCEDURE — 99213 OFFICE O/P EST LOW 20 MIN: CPT

## 2022-12-06 PROCEDURE — 73562 X-RAY EXAM OF KNEE 3: CPT | Mod: LT

## 2022-12-07 PROBLEM — M23.8X2 CHONDRAL DEFECT OF LEFT PATELLA: Status: ACTIVE | Noted: 2021-08-13

## 2022-12-07 PROBLEM — S83.002D SUBLUXATION OF LEFT PATELLA, SUBSEQUENT ENCOUNTER: Status: ACTIVE | Noted: 2021-07-23

## 2022-12-07 NOTE — PHYSICAL EXAM
[de-identified] : Oriented to time, place, person\par Mood: Normal\par Affect: Normal\par Appearance: Healthy, well appearing, no acute distress.\par Gait: Normal\par Assistive Devices: None \par \par Left Knee Exam:\par \par Skin: Incision(s) Clean, dry, intact, no drainage, healed\par Inspection: No swelling, no effusion\par Pulses: 2+ DP/PT pulses \par ROM: 0-120 knee flexion.  Pain with further flexion\par Tenderness: None\par Stability: Stable, no apprehension with patella stress\par Strength: 4+/5 Q 5/5 TA/GS/EHL, mild atrophy to the quad\par Neuro: Intact to light touch throughout.  [de-identified] : The following radiographs were ordered and read by me during this patients visit. I reviewed each radiograph in detail with the patient and discussed the findings as highlighted below. \par \par 2 views left knee show continued evidence of tibial tubercle osteotomy with 1 cm medial translation tubercle. Evidence of union.

## 2022-12-07 NOTE — ADDENDUM
[FreeTextEntry1] : This note was written by Maddie Segura on 12/06/2022 acting solely as a scribe for Dr. Ronni Almodovar.\par \par All medical record entries made by the Scribe were at my, Dr. Ronni Almodovar, direction and personally dictated by me on 12/06/2022. I have personally reviewed the chart and agree that the record accurately reflects my personal performance of the history, physical exam, assessment and plan.

## 2022-12-07 NOTE — HISTORY OF PRESENT ILLNESS
[de-identified] : 36 y/o female s/p left knee TAYLER, removal of loose body, chondroplasty medial patellar facet, lateral release, tibial tubercle osteotomy, medial patella imbrication 11.3.2021. Here for one year follow up. C/O pain in cold weather otherwise she is doing well. She has not gone to PT recently due to inability presented because of insurance. Takes Tylenol as needed. Denies post op complications.  + Mild discomfort over the anterior knee.  Patient does report significant improvement of symptoms from preoperative pain.

## 2022-12-07 NOTE — DISCUSSION/SUMMARY
[de-identified] : 38 y/o female s/p left knee TAYLER, removal of loose body, chondroplasty medial patellar facet, lateral release, tibial tubercle osteotomy, medial patella imbrication\par \par Patient has completed a postoperative rehabilitation program.  Patient is a been unable to go to physical therapy recently, and has plateaued with her range of motion but approximately 120 degrees. 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 The patient is very happy with her progress at this time, and preoperative symptoms of catching and instability to the patellofemoral joint have resolved.  We discussed that her mild complaints of pain during cold weather as well as some pain over the hardware is not worrisome.\par \par Recommendations: Retry PT for ROM therapy, new Rx given. HEP.  Activity to tolerance. NSAIDS / Ice/elevate as needed \par \par Followup 3mos

## 2023-04-06 ENCOUNTER — APPOINTMENT (OUTPATIENT)
Dept: ORTHOPEDIC SURGERY | Facility: CLINIC | Age: 38
End: 2023-04-06

## 2023-08-10 NOTE — ASU PREOPERATIVE ASSESSMENT, ADULT ([facility] ONLY) - NPO AFTER
Please understand that at this time there is no evidence for a more serious underlying process, but that early in the process of an illness or injury, an emergency department workup can be falsely reassuring. You should contact your family doctor within the next 48 hours for a follow up appointment    [address]!!!    From [facility] ([facility]) and [facility]    On behalf of the Emergency Department staff at [facility]), I would like to thank you for giving us the opportunity to address your health care needs and concerns. We hope that during your visit, our service was delivered in a professional and caring manner. Please keep [facility] ([facility]) in mind as we walk with you down the path to your own personal wellness. Please expect an automated text message or email from us so we can ask a few questions about your health and progress. Based on your answers, a clinician may call you back to offer help and instructions. Please understand that early in the process of an illness or injury, an emergency department workup can be falsely reassuring. If you notice any worsening, changing or persistent symptoms please call your family doctor or return to the ER immediately. Tell us how we did during your visit at [email or web address]   and let us know about your experience
23:00

## 2023-11-03 ENCOUNTER — APPOINTMENT (OUTPATIENT)
Dept: GASTROENTEROLOGY | Facility: CLINIC | Age: 38
End: 2023-11-03
Payer: MEDICAID

## 2023-11-03 VITALS
WEIGHT: 172 LBS | OXYGEN SATURATION: 99 % | HEART RATE: 106 BPM | DIASTOLIC BLOOD PRESSURE: 80 MMHG | TEMPERATURE: 97.3 F | BODY MASS INDEX: 29.37 KG/M2 | SYSTOLIC BLOOD PRESSURE: 124 MMHG | HEIGHT: 64 IN

## 2023-11-03 PROCEDURE — 99203 OFFICE O/P NEW LOW 30 MIN: CPT

## 2023-11-03 RX ORDER — FAMOTIDINE 40 MG/1
40 TABLET, FILM COATED ORAL
Refills: 0 | Status: ACTIVE | COMMUNITY

## 2023-11-03 RX ORDER — PROPRANOLOL HYDROCHLORIDE 40 MG/1
40 TABLET ORAL
Refills: 0 | Status: ACTIVE | COMMUNITY

## 2023-11-22 ENCOUNTER — APPOINTMENT (OUTPATIENT)
Dept: GASTROENTEROLOGY | Facility: AMBULATORY MEDICAL SERVICES | Age: 38
End: 2023-11-22
Payer: MEDICAID

## 2023-11-22 PROCEDURE — 43239 EGD BIOPSY SINGLE/MULTIPLE: CPT

## 2024-01-29 ENCOUNTER — APPOINTMENT (OUTPATIENT)
Dept: GASTROENTEROLOGY | Facility: CLINIC | Age: 39
End: 2024-01-29
Payer: COMMERCIAL

## 2024-01-29 VITALS
OXYGEN SATURATION: 98 % | BODY MASS INDEX: 27.83 KG/M2 | WEIGHT: 163 LBS | TEMPERATURE: 97.5 F | HEART RATE: 85 BPM | HEIGHT: 64 IN | SYSTOLIC BLOOD PRESSURE: 130 MMHG | DIASTOLIC BLOOD PRESSURE: 80 MMHG

## 2024-01-29 DIAGNOSIS — Z78.9 OTHER SPECIFIED HEALTH STATUS: ICD-10-CM

## 2024-01-29 DIAGNOSIS — Z87.09 PERSONAL HISTORY OF OTHER DISEASES OF THE RESPIRATORY SYSTEM: ICD-10-CM

## 2024-01-29 DIAGNOSIS — Z82.49 FAMILY HISTORY OF ISCHEMIC HEART DISEASE AND OTHER DISEASES OF THE CIRCULATORY SYSTEM: ICD-10-CM

## 2024-01-29 DIAGNOSIS — R10.13 EPIGASTRIC PAIN: ICD-10-CM

## 2024-01-29 PROCEDURE — 99213 OFFICE O/P EST LOW 20 MIN: CPT

## 2024-01-29 NOTE — REVIEW OF SYSTEMS
[Fever] : no fever [Chills] : no chills [Recent Weight Loss (___ Lbs)] : no recent weight loss [Chest Pain] : no chest pain [Palpitations] : no palpitations [SOB on Exertion] : no shortness of breath during exertion [As Noted in HPI] : as noted in HPI [Vomiting] : vomiting [Constipation] : no constipation [Diarrhea] : diarrhea [Heartburn] : no heartburn [Melena (black stool)] : no melena [Bloating (gassiness)] : no bloating

## 2024-01-29 NOTE — HISTORY OF PRESENT ILLNESS
[FreeTextEntry1] : I saw patient Jaimee Herman in the office today.  The patient is a 39-year-old female who has no history of hypertension diabetes or coronary issues.  Jaimee was seen for dyspeptic symptoms late last year and underwent an upper endoscopy.  The exam was essentially normal, and the patient was only taking famotidine intermittently the patient was accompanied by her daughter who served as a .  The patient developed acute onset of vomiting with diarrhea and fever and went to the emergency room.  At that time, she was diagnosed with influenza and is currently recovered.  She states that she is taking famotidine however she has no significant reflux and is now constipated.  She states that she has been chronically constipated throughout the years.  She also notices increased intestinal gas.  The patient does not abuse tobacco caffeine or ethanol.  There is no blood in the stool or on the toilet tissue. [de-identified] : The patient had a normal endoscopy done less than 6 months ago.

## 2024-01-29 NOTE — ASSESSMENT
[FreeTextEntry1] : The patient is a 39-year-old female who generally enjoys good health.  The patient had a complaint of dyspepsia late last year and underwent an endoscopy which was essentially normal.  The patient was able to stop acid reducing medication.  Most recently she developed nausea vomiting and diarrhea with a diagnosis of influenza.  I feel that her GI symptoms were on the basis of her viral illness she is currently asymptomatic and will continue to famotidine for several more weeks.  She is chronically constipated, and I told her to take a high potency probiotic and use MiraLAX as needed the patient is not demonstrating any significant signs of colonic pathology at the present time.  The daughter will call me with a progress report in several weeks.

## 2024-05-25 NOTE — ASU PREOPERATIVE ASSESSMENT, ADULT ([facility] ONLY) - BP NONINVASIVE DIASTOLIC (MM HG)
General Discharge Instructions:  Use Tylenol for pain control as needed  Please resume all regular home medications unless specifically advised not to take a particular medication. Also, please take any new medications as prescribed.  Please get plenty of rest, continue to ambulate several times per day, and drink adequate amounts of fluids.     Warning Signs:  Please call your doctor if you experience the following:  *You experience new chest pain, pressure, squeezing or tightness.  *New or worsening cough, shortness of breath, or wheeze.  *If you are vomiting and cannot keep down fluids or your medications.  *You are getting dehydrated due to continued vomiting, diarrhea, or other reasons. Signs of dehydration include dry mouth, rapid heartbeat, or feeling dizzy or faint when standing.  *You see blood or dark/black material when you vomit or have a bowel movement.  *You experience burning when you urinate, have blood in your urine, or experience a discharge.  *Your pain is not improving within 8-12 hours or is not gone within 24 hours. Call or return immediately if your pain is getting worse, changes location, or moves to your chest or back.  *You have shaking chills, or fever greater than 100.4 degrees Fahrenheit.  *Any change in your symptoms, or any new symptoms that concern you  
85